# Patient Record
(demographics unavailable — no encounter records)

---

## 2025-03-11 NOTE — DISCUSSION/SUMMARY
[de-identified] : 53 yo F with vague lateral compartment myalgia and tibiotalar arthrosis on xray no significant findings on exam rec: Mobic and PT, vitradha nD RTC 6 weeks  next visit: if no improvement consider MR

## 2025-03-11 NOTE — IMAGING
[de-identified] : LEFT  ANKLE  Inspection:  no swelling  Palpation: no tenderness  Range of Motion: normal dorsiflexion, normal plantarflexion  Strength: normal   Neurovascular intact distally  RIGHT ANKLE  Inspection:  no swelling  Palpation: no tenderness  Range of Motion: normal dorsiflexion, normal plantarflexion  Strength: normal   Neurovascular intact distally

## 2025-03-11 NOTE — HISTORY OF PRESENT ILLNESS
[de-identified] : Date of Injury/Onset: 11/27/2024 Pain: At Rest: 1 With Activity: 5 Mechanism of injury: This is NOT a Work Related Injury being treated under Worker's Compensation. This is NOT an athletic injury occurring associated with an interscholastic or organized sports team. Quality of symptoms: Improves with: Worse with: Prior treatment: Prior Imaging: x-rays Reports Available For Review Today: report only Out of work/sport: working School/Sport/Position/Occupation: mall employee    03/11/2025 ILIANA Tajik speaking,  survives used ( [id number]), here today for evaluation of b/l ankle pain [place] [facility]27[facility]2, patient tripped over metal cart that was placed behind her leading ehr to fall into hard ground, seeked help at [facility], had x-rays done, had been doing PT. Reports swelling and bruising.  Pain indicated to all aspects of b/l ankles lateral aspect, R>L, 8/10, intermittent, achy and sharp at times.  Worsening after standing and walking, work duties for too long.   Patient had been taking ibuprofen and Tylenol for pain relief with some relief.  PMhx of left shoulder surgery (12/24) & left knee surgery (08/2024), weight loss medication

## 2025-03-11 NOTE — DATA REVIEWED
[FreeTextEntry1] : 3 v b/l ankle neg for fx or dislocation mild tibiotalar arthrorsis R worse than left

## 2025-05-13 NOTE — DISCUSSION/SUMMARY
[de-identified] : 53 yo F with vague lateral compartment myalgia and tibiotalar arthrosis on xray no significant findings on exam rec: Mobic and PT, vitamin D RTC 6 weeks  5/13/25:  R ankle pain , predominately AL impingement, symptoms seem to have been worsened while driving rec MR r ankle and ASO  next visit: MR review consider PT v CSI

## 2025-05-13 NOTE — IMAGING
[de-identified] : LEFT  ANKLE  Inspection:  no swelling  Palpation: tenderness lateral mal and lateral/anterior joint line  Range of Motion: normal dorsiflexion, normal plantarflexion  Strength: normal   Neurovascular intact distally  RIGHT ANKLE  Inspection:  mild swelling  Palpation: tenderness lateral mal and lateral/anterior joint line  Range of Motion: normal dorsiflexion, normal plantarflexion  Strength: normal   Neurovascular intact distally, +lachman

## 2025-05-13 NOTE — HISTORY OF PRESENT ILLNESS
[de-identified] : Date of Injury/Onset: 11/27/2024 Pain: At Rest: 1 With Activity: 5 Mechanism of injury: This is NOT a Work Related Injury being treated under Worker's Compensation. This is NOT an athletic injury occurring associated with an interscholastic or organized sports team. Quality of symptoms: Improves with: Worse with: Prior treatment: Prior Imaging: x-rays Reports Available For Review Today: report only Out of work/sport: working School/Sport/Position/Occupation: mall employee    03/11/2025 ILIANA Serbian speaking,  survives used ( [id number]), here today for evaluation of b/l ankle pain [facility]/2, patient tripped over metal cart that was placed behind her leading ehr to fall into hard ground, seeked help at [facility], had x-rays done, had been doing PT. Reports swelling and bruising.  Pain indicated to all aspects of b/l ankles lateral aspect, R>L, 8/10, intermittent, achy and sharp at times.  Worsening after standing and walking, work duties for too long.   Patient had been taking ibuprofen and Tylenol for pain relief with some relief.  PMhx of left shoulder surgery (12/24) & left knee surgery (08/2024), weight loss medication  05/13/2025 ILIANA  is here today to follow up on b/l ankle. Patient was cut off from PT about 1 month ago. Patient reports swelling & stabbing pain. Patient had been taking Mobic & vitamins D with temporary improvement. c/o persistent pain and swelling

## 2025-05-19 NOTE — HISTORY OF PRESENT ILLNESS
[de-identified] : 51 yo female s/p todd searse last June presents today with 2 weeks of intermittent Right subcostal pain. he states pain started while she was working. She denies any nausea or vomiting. pain is not related to meals. She states some recent constipation. Had colonoscopy 1 year ago.

## 2025-05-19 NOTE — ASSESSMENT
[FreeTextEntry1] : 53 yo female with some episodes of right side pain. ?  musculoskeletal vs gas pains. Patient to observe for now and f/u if pains continue over 1 month

## 2025-05-19 NOTE — REASON FOR VISIT
[___] : [unfilled] [No Ischemia] : no Ischemia [LVEF ___%] : LVEF [unfilled]% [de-identified] : 7/12/2021 [de-identified] : 11/13/2018 [de-identified] : 1/13/2018 [Follow-Up: _____] : a [unfilled] follow-up visit

## 2025-05-19 NOTE — PHYSICAL EXAM
[JVD] : no jugular venous distention  [Abdomen Tenderness] : ~T ~M No abdominal tenderness [Alert] : alert [Oriented to Person] : oriented to person [Oriented to Place] : oriented to place [Oriented to Time] : oriented to time [Calm] : calm [de-identified] : MITESH VALENCIA NAD [de-identified] : EOMI [de-identified] : soft NT, ND No costal tenderness

## 2025-07-28 NOTE — DATA REVIEWED
[FreeTextEntry1] : 3 v b/l ankle neg for fx or dislocation mild tibiotalar arthrorsis R worse than left  MRI Right ankle 7/16/25: 1. Mild non-insertional tendinosis of the Achilles tendon. Small dorsal calcaneal enthesophyte.  2. Mild insertional tendinosis of the posterior tibialis tendon with mild tenosynovitis. Os naviculare.  3. Acute on chronic plantar fasciopathy of the proximal aspect of the central cord. Small plantar calcaneal enthesophyte.  4. No acute ligamentous injury.

## 2025-07-28 NOTE — HISTORY OF PRESENT ILLNESS
[de-identified] : Date of Injury/Onset: 11/27/2024 Pain: At Rest: 1 With Activity: 5 Mechanism of injury: This is NOT a Work Related Injury being treated under Worker's Compensation. This is NOT an athletic injury occurring associated with an interscholastic or organized sports team. Quality of symptoms: Improves with: Worse with: Prior treatment: Prior Imaging: x-rays Reports Available For Review Today: report only Out of work/sport: working School/Sport/Position/Occupation: mall employee    03/11/2025 ILIANA Korean speaking,  survives used ( [id number]), here today for evaluation of b/l ankle pain [facility], patient tripped over metal cart that was placed behind her leading ehr to fall into hard ground, seeked help at [facility], had x-rays done, had been doing PT. Reports swelling and bruising.  Pain indicated to all aspects of b/l ankles lateral aspect, R>L, 8/10, intermittent, achy and sharp at times.  Worsening after standing and walking, work duties for too long.   Patient had been taking ibuprofen and Tylenol for pain relief with some relief.  PMhx of left shoulder surgery (12/24) & left knee surgery (08/2024), weight loss medication  05/13/2025 ILIANA  is here today to follow up on b/l ankle. Patient was cut off from PT about 1 month ago. Patient reports swelling & stabbing pain. Patient had been taking Mobic & vitamins D with temporary improvement. c/o persistent pain and swelling  07/28/2025 ILIANA  is here today to follow up on MRI results

## 2025-07-28 NOTE — IMAGING
[de-identified] : LEFT  ANKLE  Inspection:  no swelling  Palpation: tenderness lateral mal and lateral/anterior joint line  Range of Motion: normal dorsiflexion, normal plantarflexion  Strength: normal   Neurovascular intact distally  RIGHT ANKLE  Inspection:  mild swelling  Palpation: tenderness lateral mal and lateral/anterior joint line  Range of Motion: normal dorsiflexion, normal plantarflexion  Strength: normal   Neurovascular intact distally, +lachman

## 2025-07-28 NOTE — DISCUSSION/SUMMARY
[Medication Risks Reviewed] : Medication risks reviewed [de-identified] : 53 yo F with vague lateral compartment myalgia and tibiotalar arthrosis on xray no significant findings on exam rec: Mobic and PT, vitamin D RTC 6 weeks  5/13/25:  R ankle pain , predominately AL impingement, symptoms seem to have been worsened while driving rec MR r ankle and ASO  next visit: MR review consider PT v CSI *** 7/28/25:  R ankle pain , predominately AL impingement, symptoms seem to have been worsened while driving MR negative for peroneal tendinitis or AL impingement, tendinities noted posteriorly and medially rec aso , PT , mobic RTC 3 weeks OOW 3 weeks  next visit: consider CSI